# Patient Record
Sex: FEMALE | Race: WHITE | NOT HISPANIC OR LATINO | URBAN - METROPOLITAN AREA
[De-identification: names, ages, dates, MRNs, and addresses within clinical notes are randomized per-mention and may not be internally consistent; named-entity substitution may affect disease eponyms.]

---

## 2018-07-12 NOTE — PATIENT DISCUSSION
Continue: Combigan (brimonidine-timolol): drops: 0.2-0.5% 1 drop twice a day as directed into both eyes

## 2018-07-12 NOTE — PATIENT DISCUSSION
CATARACTS OS - NOT VISUALLY SIGNIFICANT TO PATIENT. WATCH FOR NOW. PLAN TO USE CONSERVATIVE APPROACH TO SX DUE TO BEING MONOCULAR.

## 2018-07-12 NOTE — PATIENT DISCUSSION
PTERYGIUM OD - THIS IS NOT INTERFERING WITH THE PATIENTS VISION AT THIS TIME. MONITOR. FOLLOWED BY DR. Guille Goss.

## 2018-07-12 NOTE — PATIENT DISCUSSION
SUPERFICIAL SCARRING OS- THIS DOES NOT SEEM TO BOTHER THE PATIENT.  CONTINUE TO FOLLOW UP WITH DR. Marta Langston

## 2022-06-15 ENCOUNTER — NEW PATIENT (OUTPATIENT)
Dept: URBAN - METROPOLITAN AREA CLINIC 4 | Facility: CLINIC | Age: 62
End: 2022-06-15

## 2022-06-15 DIAGNOSIS — H11.153: ICD-10-CM

## 2022-06-15 PROCEDURE — 92015 DETERMINE REFRACTIVE STATE: CPT

## 2022-06-15 PROCEDURE — 92004 COMPRE OPH EXAM NEW PT 1/>: CPT

## 2022-06-15 ASSESSMENT — TONOMETRY
OD_IOP_MMHG: 15
OS_IOP_MMHG: 16

## 2022-06-15 ASSESSMENT — KERATOMETRY
OD_AXISANGLE2_DEGREES: 72
OD_AXISANGLE_DEGREES: 162
OD_K2POWER_DIOPTERS: 46.25
OS_AXISANGLE2_DEGREES: 95
OS_AXISANGLE_DEGREES: 005
OS_K1POWER_DIOPTERS: 45.25
OD_K1POWER_DIOPTERS: 44.50
OS_K2POWER_DIOPTERS: 47.25

## 2022-06-15 ASSESSMENT — VISUAL ACUITY
OU_SC: 20/100-1
OD_SC: 20/100-1
OS_SC: 20/80-1

## 2022-08-29 ASSESSMENT — KERATOMETRY
OS_K1POWER_DIOPTERS: 45.25
OD_K1POWER_DIOPTERS: 44.50
OS_AXISANGLE2_DEGREES: 95
OS_K2POWER_DIOPTERS: 47.25
OD_K2POWER_DIOPTERS: 46.25
OS_AXISANGLE_DEGREES: 005
OD_AXISANGLE_DEGREES: 162
OD_AXISANGLE2_DEGREES: 72

## 2022-08-31 ENCOUNTER — POST-OP (OUTPATIENT)
Dept: URBAN - METROPOLITAN AREA CLINIC 4 | Facility: CLINIC | Age: 62
End: 2022-08-31

## 2022-08-31 DIAGNOSIS — Z98.890: ICD-10-CM

## 2022-08-31 PROCEDURE — 99024 POSTOP FOLLOW-UP VISIT: CPT

## 2022-08-31 ASSESSMENT — VISUAL ACUITY
OD_SC: 20/60
OS_SC: 20/40

## 2022-08-31 ASSESSMENT — TONOMETRY
OD_IOP_MMHG: 13
OS_IOP_MMHG: 13

## 2023-07-06 ENCOUNTER — ESTABLISHED PATIENT (OUTPATIENT)
Dept: URBAN - METROPOLITAN AREA CLINIC 4 | Facility: CLINIC | Age: 63
End: 2023-07-06

## 2023-07-06 DIAGNOSIS — H11.153: ICD-10-CM

## 2023-07-06 DIAGNOSIS — E11.9: ICD-10-CM

## 2023-07-06 DIAGNOSIS — H04.123: ICD-10-CM

## 2023-07-06 PROCEDURE — 92015 DETERMINE REFRACTIVE STATE: CPT

## 2023-07-06 PROCEDURE — 92014 COMPRE OPH EXAM EST PT 1/>: CPT

## 2023-07-06 ASSESSMENT — VISUAL ACUITY
OS_SC: 20/40-1
OD_GLARE: 20/50
OD_SC: 20/70-1
OS_GLARE: 20/50
OU_SC: 20/40-1

## 2023-07-06 ASSESSMENT — KERATOMETRY
OS_AXISANGLE2_DEGREES: 102
OS_AXISANGLE_DEGREES: 012
OD_AXISANGLE2_DEGREES: 76
OS_K1POWER_DIOPTERS: 45.50
OD_AXISANGLE_DEGREES: 166
OD_K2POWER_DIOPTERS: 46.00
OD_K1POWER_DIOPTERS: 44.50
OS_K2POWER_DIOPTERS: 46.25

## 2023-07-06 ASSESSMENT — TONOMETRY
OS_IOP_MMHG: 16
OD_IOP_MMHG: 15